# Patient Record
(demographics unavailable — no encounter records)

---

## 2025-06-01 NOTE — PHYSICAL EXAM
[Normal LUE] : Left Upper Extremity:  Inspection and /or palpation: no malalignment, asymmetry, crepitation, tenderness, masses or effusions.  Range of Motion Assessment: Full ROM, no pain, crepitation or contracture.  Stability Assessment: Stable, no subluxation or dislocation.  Muscle Strength/Tone Assessment: 5/5 Motor Strength, no atrophy (weakness), normal movements. [Wrist Neurological Dysfunction Phalen's Maneuver Bilateral] : negative Phalen's [Tinel's Sign Median Nerve At The Wrist (Carpal Tunnel)] : negative Tinel's [Reverse Phalen's Test Both Wrists] : negative Reverse Phalen's [Surendra's Test For Radial Artery Patency Bilaterally] : Radial a. patency via Surendra's test [Surendra's Test For Ulnar Artery Patency Bilaterally] : Ulnar a. patency via Surendra's test [Normal] : No respiratory distress and non-labored breathing on room air [de-identified] : I personally interpreted and reviewed the x-ray of the thumb, AP lateral oblique: Unremarkable for any joint widening or any other acute osseous abnormalities. [de-identified] : Right thumb: No swelling and no ecchymosis over thumb Able to oppose thumb to the index, middle, ring, small and base of small finger Intact EPL and FPL function with active IP joint flexion and hyperextension No tenderness over UCL or RCL at the MCP joint No instability at the MCP joint with either valgus or varus stress Normal sensation in thumb tip Brisk capillary refill

## 2025-06-01 NOTE — ASSESSMENT
[FreeTextEntry1] : 37-year-old male with right thumb pain post injury while playing volleyball Discussed with patient today the clinical findings in detail, and also reviewed x-ray imaging: No acute osseous abnormalities.  Given persistent pain however, recommend MRI to evaluate for any ligamentous injury and to ascertain integrity of the UCL and RCL ligaments. MRI ordered All of the patient's questions answered and Recommend not lifting, pushing or pulling anything more than 5 pounds until MRI is performed He understands and agrees with the plan

## 2025-06-01 NOTE — HISTORY OF PRESENT ILLNESS
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  He now has a throbbing achy pain, 4 out of 10 for the past month.  Symptoms is there often, during the day when using the hand.  Trials of rest splint or brace heat and ice has not improved symptoms.  Symptoms improved only with rest.  Has not tried any anti-inflammatories yet. Denies any previous injury to the hand or thumb.